# Patient Record
Sex: MALE | Race: WHITE | NOT HISPANIC OR LATINO
[De-identification: names, ages, dates, MRNs, and addresses within clinical notes are randomized per-mention and may not be internally consistent; named-entity substitution may affect disease eponyms.]

---

## 2019-09-25 ENCOUNTER — HOSPITAL (OUTPATIENT)
Dept: OTHER | Age: 59
End: 2019-09-25

## 2019-09-25 ENCOUNTER — HOSPITAL (OUTPATIENT)
Dept: OTHER | Age: 59
End: 2019-09-25
Attending: COLON & RECTAL SURGERY

## 2019-09-27 LAB — PATHOLOGIST NAME: NORMAL

## 2023-05-10 ENCOUNTER — LAB SERVICES (OUTPATIENT)
Dept: LAB | Age: 63
End: 2023-05-10

## 2023-05-10 DIAGNOSIS — Z01.818 ENCOUNTER FOR OTHER PREPROCEDURAL EXAMINATION: Primary | ICD-10-CM

## 2023-05-10 LAB
ALBUMIN SERPL-MCNC: 4 G/DL (ref 3.6–5.1)
ALBUMIN/GLOB SERPL: 1.3 {RATIO} (ref 1–2.4)
ALP SERPL-CCNC: 105 UNITS/L (ref 45–117)
ALT SERPL-CCNC: 28 UNITS/L
ANION GAP SERPL CALC-SCNC: 8 MMOL/L (ref 7–19)
AST SERPL-CCNC: 22 UNITS/L
BILIRUB SERPL-MCNC: 0.8 MG/DL (ref 0.2–1)
BUN SERPL-MCNC: 12 MG/DL (ref 6–20)
BUN/CREAT SERPL: 18 (ref 7–25)
CALCIUM SERPL-MCNC: 9.2 MG/DL (ref 8.4–10.2)
CHLORIDE SERPL-SCNC: 108 MMOL/L (ref 97–110)
CO2 SERPL-SCNC: 27 MMOL/L (ref 21–32)
CREAT SERPL-MCNC: 0.68 MG/DL (ref 0.67–1.17)
DEPRECATED RDW RBC: 45.1 FL (ref 39–50)
ERYTHROCYTE [DISTWIDTH] IN BLOOD: 13.3 % (ref 11–15)
FASTING DURATION TIME PATIENT: 0 HOURS (ref 0–999)
GFR SERPLBLD BASED ON 1.73 SQ M-ARVRAT: >90 ML/MIN
GLOBULIN SER-MCNC: 3.2 G/DL (ref 2–4)
GLUCOSE SERPL-MCNC: 102 MG/DL (ref 70–99)
HCT VFR BLD CALC: 46.9 % (ref 39–51)
HGB BLD-MCNC: 15.5 G/DL (ref 13–17)
MCH RBC QN AUTO: 30.8 PG (ref 26–34)
MCHC RBC AUTO-ENTMCNC: 33 G/DL (ref 32–36.5)
MCV RBC AUTO: 93.1 FL (ref 78–100)
NRBC BLD MANUAL-RTO: 0 /100 WBC
PLATELET # BLD AUTO: 205 K/MCL (ref 140–450)
POTASSIUM SERPL-SCNC: 4.2 MMOL/L (ref 3.4–5.1)
PROT SERPL-MCNC: 7.2 G/DL (ref 6.4–8.2)
RBC # BLD: 5.04 MIL/MCL (ref 4.5–5.9)
SODIUM SERPL-SCNC: 139 MMOL/L (ref 135–145)
WBC # BLD: 4.8 K/MCL (ref 4.2–11)

## 2023-05-10 PROCEDURE — 36415 COLL VENOUS BLD VENIPUNCTURE: CPT | Performed by: INTERNAL MEDICINE

## 2023-05-10 PROCEDURE — 80053 COMPREHEN METABOLIC PANEL: CPT | Performed by: CLINICAL MEDICAL LABORATORY

## 2023-05-10 PROCEDURE — 85027 COMPLETE CBC AUTOMATED: CPT | Performed by: CLINICAL MEDICAL LABORATORY

## 2023-05-12 RX ORDER — DEXTROSE AND SODIUM CHLORIDE 5; .45 G/100ML; G/100ML
INJECTION, SOLUTION INTRAVENOUS CONTINUOUS
Status: CANCELLED | OUTPATIENT
Start: 2023-05-12

## 2023-05-12 ASSESSMENT — ACTIVITIES OF DAILY LIVING (ADL)
ADL_BEFORE_ADMISSION: INDEPENDENT
HISTORY OF FALLING IN THE LAST YEAR (PRIOR TO ADMISSION): NO
ADL_SCORE: 12

## 2023-05-17 ENCOUNTER — ANESTHESIA (OUTPATIENT)
Dept: GASTROENTEROLOGY | Age: 63
End: 2023-05-17

## 2023-05-17 ENCOUNTER — ANESTHESIA EVENT (OUTPATIENT)
Dept: GASTROENTEROLOGY | Age: 63
End: 2023-05-17

## 2023-05-17 ENCOUNTER — HOSPITAL ENCOUNTER (OUTPATIENT)
Dept: GASTROENTEROLOGY | Age: 63
Discharge: HOME | End: 2023-05-17
Attending: COLON & RECTAL SURGERY

## 2023-05-17 VITALS
RESPIRATION RATE: 18 BRPM | TEMPERATURE: 96.8 F | SYSTOLIC BLOOD PRESSURE: 126 MMHG | OXYGEN SATURATION: 98 % | HEIGHT: 66 IN | WEIGHT: 227.74 LBS | DIASTOLIC BLOOD PRESSURE: 59 MMHG | BODY MASS INDEX: 36.6 KG/M2 | HEART RATE: 69 BPM

## 2023-05-17 DIAGNOSIS — Z15.09 LYNCH SYNDROME: ICD-10-CM

## 2023-05-17 DIAGNOSIS — Z86.010 PERSONAL HISTORY OF COLONIC POLYPS: ICD-10-CM

## 2023-05-17 PROCEDURE — 10002807 HB RX 258: Performed by: ANESTHESIOLOGY

## 2023-05-17 PROCEDURE — 13000008 HB ANESTHESIA MAC OUTSIDE OR

## 2023-05-17 PROCEDURE — 10002803 HB RX 637: Performed by: COLON & RECTAL SURGERY

## 2023-05-17 PROCEDURE — 13000028 HB GI MAJOR COMPLEX CASE S/U + 1ST 15 MIN

## 2023-05-17 PROCEDURE — 10002800 HB RX 250 W HCPCS: Performed by: ANESTHESIOLOGY

## 2023-05-17 PROCEDURE — 10002807 HB RX 258: Performed by: COLON & RECTAL SURGERY

## 2023-05-17 PROCEDURE — 13000001 HB PHASE II RECOVERY EA 30 MINUTES

## 2023-05-17 PROCEDURE — 13000029 HB GI MAJOR COMPLEX CASE EA ADD MINUTE

## 2023-05-17 PROCEDURE — 88305 TISSUE EXAM BY PATHOLOGIST: CPT | Performed by: COLON & RECTAL SURGERY

## 2023-05-17 PROCEDURE — 10002801 HB RX 250 W/O HCPCS: Performed by: ANESTHESIOLOGY

## 2023-05-17 RX ORDER — PROPOFOL 10 MG/ML
INJECTION, EMULSION INTRAVENOUS PRN
Status: DISCONTINUED | OUTPATIENT
Start: 2023-05-17 | End: 2023-05-17

## 2023-05-17 RX ORDER — LIDOCAINE HYDROCHLORIDE 20 MG/ML
INJECTION, SOLUTION INFILTRATION; PERINEURAL PRN
Status: DISCONTINUED | OUTPATIENT
Start: 2023-05-17 | End: 2023-05-17

## 2023-05-17 RX ORDER — SODIUM CHLORIDE 9 MG/ML
INJECTION, SOLUTION INTRAVENOUS CONTINUOUS
Status: DISCONTINUED | OUTPATIENT
Start: 2023-05-17 | End: 2023-05-19 | Stop reason: HOSPADM

## 2023-05-17 RX ORDER — SODIUM CHLORIDE 9 MG/ML
INJECTION, SOLUTION INTRAVENOUS CONTINUOUS PRN
Status: DISCONTINUED | OUTPATIENT
Start: 2023-05-17 | End: 2023-05-17

## 2023-05-17 RX ORDER — SIMETHICONE 20 MG/.3ML
EMULSION ORAL PRN
Status: COMPLETED | OUTPATIENT
Start: 2023-05-17 | End: 2023-05-17

## 2023-05-17 RX ADMIN — PROPOFOL 50 MG: 10 INJECTION, EMULSION INTRAVENOUS at 07:55

## 2023-05-17 RX ADMIN — LIDOCAINE HYDROCHLORIDE 50 MG: 20 INJECTION, SOLUTION INFILTRATION; PERINEURAL at 07:51

## 2023-05-17 RX ADMIN — PROPOFOL 100 MG: 10 INJECTION, EMULSION INTRAVENOUS at 07:51

## 2023-05-17 RX ADMIN — PROPOFOL 100 MCG/KG/MIN: 10 INJECTION, EMULSION INTRAVENOUS at 07:51

## 2023-05-17 RX ADMIN — FENTANYL CITRATE 50 MCG: 50 INJECTION, SOLUTION INTRAMUSCULAR; INTRAVENOUS at 07:55

## 2023-05-17 RX ADMIN — FENTANYL CITRATE 50 MCG: 50 INJECTION, SOLUTION INTRAMUSCULAR; INTRAVENOUS at 08:01

## 2023-05-17 RX ADMIN — SODIUM CHLORIDE: 9 INJECTION, SOLUTION INTRAVENOUS at 06:30

## 2023-05-17 RX ADMIN — SODIUM CHLORIDE: 900 INJECTION INTRAVENOUS at 07:39

## 2023-05-17 RX ADMIN — PROPOFOL 50 MG: 10 INJECTION, EMULSION INTRAVENOUS at 08:08

## 2023-05-17 RX ADMIN — SIMETHICONE 40 MG: 20 EMULSION ORAL at 08:15

## 2023-05-17 ASSESSMENT — PAIN SCALES - GENERAL
PAINLEVEL_OUTOF10: 0

## 2023-05-17 ASSESSMENT — ENCOUNTER SYMPTOMS: EXERCISE TOLERANCE: GOOD (>4 METS)

## 2023-05-19 LAB
ASR DISCLAIMER: NORMAL
CASE RPRT: NORMAL
CLINICAL INFO: NORMAL
PATH REPORT.FINAL DX SPEC: NORMAL
PATH REPORT.FINAL DX SPEC: NORMAL
PATH REPORT.GROSS SPEC: NORMAL

## 2023-06-30 ENCOUNTER — LAB SERVICES (OUTPATIENT)
Dept: LAB | Age: 63
End: 2023-06-30

## 2023-07-05 ENCOUNTER — LAB SERVICES (OUTPATIENT)
Dept: LAB | Age: 63
End: 2023-07-05

## 2023-07-05 DIAGNOSIS — R69 UNDIAGNOSED DISEASE OR SYNDROME PRESENT: Primary | ICD-10-CM

## 2023-07-05 PROCEDURE — 87338 HPYLORI STOOL AG IA: CPT | Performed by: INTERNAL MEDICINE

## 2023-07-06 LAB — H PYLORI AG STL QL IA: NEGATIVE

## 2023-10-30 ENCOUNTER — APPOINTMENT (OUTPATIENT)
Dept: URBAN - METROPOLITAN AREA CLINIC 315 | Age: 63
Setting detail: DERMATOLOGY
End: 2023-10-30

## 2023-10-30 DIAGNOSIS — L82.0 INFLAMED SEBORRHEIC KERATOSIS: ICD-10-CM

## 2023-10-30 DIAGNOSIS — D22 MELANOCYTIC NEVI: ICD-10-CM

## 2023-10-30 DIAGNOSIS — L81.4 OTHER MELANIN HYPERPIGMENTATION: ICD-10-CM

## 2023-10-30 DIAGNOSIS — D49.2 NEOPLASM OF UNSPECIFIED BEHAVIOR OF BONE, SOFT TISSUE, AND SKIN: ICD-10-CM

## 2023-10-30 PROBLEM — D22.5 MELANOCYTIC NEVI OF TRUNK: Status: ACTIVE | Noted: 2023-10-30

## 2023-10-30 PROCEDURE — OTHER SHAVE REMOVAL: OTHER

## 2023-10-30 PROCEDURE — OTHER MIPS QUALITY: OTHER

## 2023-10-30 PROCEDURE — A4550 SURGICAL TRAYS: HCPCS

## 2023-10-30 PROCEDURE — 11301 SHAVE SKIN LESION 0.6-1.0 CM: CPT

## 2023-10-30 PROCEDURE — OTHER COUNSELING: OTHER

## 2023-10-30 PROCEDURE — 17110 DESTRUCT B9 LESION 1-14: CPT | Mod: 59

## 2023-10-30 PROCEDURE — 99203 OFFICE O/P NEW LOW 30 MIN: CPT | Mod: 25

## 2023-10-30 PROCEDURE — OTHER LIQUID NITROGEN: OTHER

## 2023-10-30 ASSESSMENT — LOCATION SIMPLE DESCRIPTION DERM
LOCATION SIMPLE: RIGHT ANTERIOR NECK
LOCATION SIMPLE: LEFT UPPER BACK
LOCATION SIMPLE: RIGHT UPPER BACK
LOCATION SIMPLE: LEFT SHOULDER

## 2023-10-30 ASSESSMENT — LOCATION ZONE DERM
LOCATION ZONE: NECK
LOCATION ZONE: TRUNK
LOCATION ZONE: ARM

## 2023-10-30 ASSESSMENT — LOCATION DETAILED DESCRIPTION DERM
LOCATION DETAILED: RIGHT INFERIOR LATERAL NECK
LOCATION DETAILED: RIGHT SUPERIOR MEDIAL UPPER BACK
LOCATION DETAILED: RIGHT SUPERIOR LATERAL UPPER BACK
LOCATION DETAILED: LEFT POSTERIOR SHOULDER
LOCATION DETAILED: LEFT MID-UPPER BACK

## 2023-10-30 NOTE — PROCEDURE: LIQUID NITROGEN
Spray Paint Technique: No
Consent: The patient's consent was obtained including but not limited to risks of crusting, scabbing, blistering, scarring, darker or lighter pigmentary change, recurrence, incomplete removal and infection.
Medical Necessity Information: It is in your best interest to select a reason for this procedure from the list below. All of these items fulfill various CMS LCD requirements except the new and changing color options.
Spray Paint Text: The liquid nitrogen was applied to the skin utilizing a spray paint frosting technique.
Medical Necessity Clause: This procedure was medically necessary because the lesions that were treated were:
Show Spray Paint Technique Variable?: Yes
Post-Care Instructions: I reviewed with the patient in detail post-care instructions. Patient is to wear sunprotection, and avoid picking at any of the treated lesions. Pt may apply Vaseline to crusted or scabbing areas.
Detail Level: Detailed

## 2023-10-30 NOTE — PROCEDURE: MIPS QUALITY
Quality 111:Pneumonia Vaccination Status For Older Adults: Patient received any pneumococcal conjugate or polysaccharide vaccine on or after their 60th birthday and before the end of the measurement period
Quality 138: Melanoma: Coordination Of Care: Treatment plan not communicated, reason not otherwise specified.
Quality 130: Documentation Of Current Medications In The Medical Record: Current Medications Documented
Quality 110: Preventive Care And Screening: Influenza Immunization: Influenza Immunization previously received during influenza season
Detail Level: Simple
Quality 431: Preventive Care And Screening: Unhealthy Alcohol Use - Screening: Patient not identified as an unhealthy alcohol user when screened for unhealthy alcohol use using a systematic screening method
Quality 47: Advance Care Plan: Advance care planning not documented, reason not otherwise specified.
Quality 226: Preventive Care And Screening: Tobacco Use: Screening And Cessation Intervention: Patient screened for tobacco use and is an ex/non-smoker
Quality 137: Melanoma: Continuity Of Care - Recall System: Recall system not utilized, reason not otherwise specified
Quality 134: Screening For Clinical Depression And Follow-Up Plan: The patient was screened for depression and the screen was negative and no follow up required

## 2023-10-30 NOTE — HPI: SKIN LESION (IRRITATED SKIN TAGS)
How Severe Are They?: mild
Is This A New Presentation, Or A Follow-Up?: Irritated Skin Lesions
Additional History: Patient would like them removed

## 2024-05-24 ENCOUNTER — LAB SERVICES (OUTPATIENT)
Dept: LAB | Age: 64
End: 2024-05-24

## 2024-05-24 DIAGNOSIS — Z01.818 PRE-OP TESTING: ICD-10-CM

## 2024-05-24 DIAGNOSIS — Z15.09 LYNCH SYNDROME: Primary | ICD-10-CM

## 2024-05-24 LAB
ALBUMIN SERPL-MCNC: 3.7 G/DL (ref 3.6–5.1)
ALBUMIN/GLOB SERPL: 1.2 {RATIO} (ref 1–2.4)
ALP SERPL-CCNC: 94 UNITS/L (ref 45–117)
ALT SERPL-CCNC: 20 UNITS/L
ANION GAP SERPL CALC-SCNC: 9 MMOL/L (ref 7–19)
AST SERPL-CCNC: 16 UNITS/L
BASOPHILS # BLD: 0.1 K/MCL (ref 0–0.3)
BASOPHILS NFR BLD: 1 %
BILIRUB SERPL-MCNC: 0.6 MG/DL (ref 0.2–1)
BUN SERPL-MCNC: 14 MG/DL (ref 6–20)
BUN/CREAT SERPL: 16 (ref 7–25)
CALCIUM SERPL-MCNC: 8.9 MG/DL (ref 8.4–10.2)
CHLORIDE SERPL-SCNC: 109 MMOL/L (ref 97–110)
CO2 SERPL-SCNC: 28 MMOL/L (ref 21–32)
CREAT SERPL-MCNC: 0.87 MG/DL (ref 0.67–1.17)
DEPRECATED RDW RBC: 45.3 FL (ref 39–50)
EGFRCR SERPLBLD CKD-EPI 2021: >90 ML/MIN/{1.73_M2}
EOSINOPHIL # BLD: 0.2 K/MCL (ref 0–0.5)
EOSINOPHIL NFR BLD: 5 %
ERYTHROCYTE [DISTWIDTH] IN BLOOD: 13.4 % (ref 11–15)
FASTING DURATION TIME PATIENT: ABNORMAL H
GLOBULIN SER-MCNC: 3.1 G/DL (ref 2–4)
GLUCOSE SERPL-MCNC: 100 MG/DL (ref 70–99)
HCT VFR BLD CALC: 42.9 % (ref 39–51)
HGB BLD-MCNC: 14.7 G/DL (ref 13–17)
IMM GRANULOCYTES # BLD AUTO: 0 K/MCL (ref 0–0.2)
IMM GRANULOCYTES # BLD: 0 %
LYMPHOCYTES # BLD: 1.4 K/MCL (ref 1–4)
LYMPHOCYTES NFR BLD: 34 %
MCH RBC QN AUTO: 31.6 PG (ref 26–34)
MCHC RBC AUTO-ENTMCNC: 34.3 G/DL (ref 32–36.5)
MCV RBC AUTO: 92.3 FL (ref 78–100)
MONOCYTES # BLD: 0.5 K/MCL (ref 0.3–0.9)
MONOCYTES NFR BLD: 11 %
NEUTROPHILS # BLD: 2 K/MCL (ref 1.8–7.7)
NEUTROPHILS NFR BLD: 49 %
NRBC BLD MANUAL-RTO: 0 /100 WBC
PLATELET # BLD AUTO: 205 K/MCL (ref 140–450)
POTASSIUM SERPL-SCNC: 4.5 MMOL/L (ref 3.4–5.1)
PROT SERPL-MCNC: 6.8 G/DL (ref 6.4–8.2)
RBC # BLD: 4.65 MIL/MCL (ref 4.5–5.9)
SODIUM SERPL-SCNC: 141 MMOL/L (ref 135–145)
WBC # BLD: 4.2 K/MCL (ref 4.2–11)

## 2024-05-24 PROCEDURE — 85025 COMPLETE CBC W/AUTO DIFF WBC: CPT | Performed by: CLINICAL MEDICAL LABORATORY

## 2024-05-24 PROCEDURE — 36415 COLL VENOUS BLD VENIPUNCTURE: CPT | Performed by: INTERNAL MEDICINE

## 2024-05-24 PROCEDURE — 80053 COMPREHEN METABOLIC PANEL: CPT | Performed by: CLINICAL MEDICAL LABORATORY

## 2024-05-31 SDOH — SOCIAL STABILITY: SOCIAL INSECURITY: HOW OFTEN DOES ANYONE, INCLUDING FAMILY AND FRIENDS, INSULT OR TALK DOWN TO YOU?: NEVER

## 2024-05-31 SDOH — SOCIAL STABILITY: SOCIAL INSECURITY: HOW OFTEN DOES ANYONE, INCLUDING FAMILY AND FRIENDS, PHYSICALLY HURT YOU?: NEVER

## 2024-05-31 SDOH — SOCIAL STABILITY: SOCIAL INSECURITY: HOW OFTEN DOES ANYONE, INCLUDING FAMILY AND FRIENDS, SCREAM OR CURSE AT YOU?: NEVER

## 2024-05-31 SDOH — SOCIAL STABILITY: SOCIAL INSECURITY: HOW OFTEN DOES ANYONE, INCLUDING FAMILY AND FRIENDS, THREATEN YOU WITH HARM?: NEVER

## 2024-05-31 ASSESSMENT — ACTIVITIES OF DAILY LIVING (ADL)
ADL_BEFORE_ADMISSION: INDEPENDENT
ADL_SCORE: 12

## 2024-06-05 ENCOUNTER — ANESTHESIA (OUTPATIENT)
Dept: GASTROENTEROLOGY | Age: 64
End: 2024-06-05

## 2024-06-05 ENCOUNTER — HOSPITAL ENCOUNTER (OUTPATIENT)
Dept: GASTROENTEROLOGY | Age: 64
Discharge: HOME | End: 2024-06-05
Attending: COLON & RECTAL SURGERY

## 2024-06-05 ENCOUNTER — ANESTHESIA EVENT (OUTPATIENT)
Dept: GASTROENTEROLOGY | Age: 64
End: 2024-06-05

## 2024-06-05 VITALS
WEIGHT: 225 LBS | SYSTOLIC BLOOD PRESSURE: 109 MMHG | OXYGEN SATURATION: 100 % | HEIGHT: 66 IN | DIASTOLIC BLOOD PRESSURE: 67 MMHG | BODY MASS INDEX: 36.16 KG/M2 | HEART RATE: 58 BPM | TEMPERATURE: 96.8 F | RESPIRATION RATE: 18 BRPM

## 2024-06-05 DIAGNOSIS — Z86.010 HISTORY OF COLONIC POLYPS: ICD-10-CM

## 2024-06-05 DIAGNOSIS — Z15.09 LYNCH SYNDROME: ICD-10-CM

## 2024-06-05 PROCEDURE — 10002800 HB RX 250 W HCPCS: Performed by: SURGERY

## 2024-06-05 PROCEDURE — 10002801 HB RX 250 W/O HCPCS: Performed by: COLON & RECTAL SURGERY

## 2024-06-05 PROCEDURE — 13000008 HB ANESTHESIA MAC OUTSIDE OR

## 2024-06-05 PROCEDURE — 13000024 HB GI COMPLEX CASE S/U + 1ST 15 MIN

## 2024-06-05 PROCEDURE — 13000025 HB GI COMPLEX CASE EACH ADD MINUTE

## 2024-06-05 PROCEDURE — 13000001 HB PHASE II RECOVERY EA 30 MINUTES

## 2024-06-05 PROCEDURE — 10002801 HB RX 250 W/O HCPCS: Performed by: SURGERY

## 2024-06-05 PROCEDURE — 88305 TISSUE EXAM BY PATHOLOGIST: CPT | Performed by: COLON & RECTAL SURGERY

## 2024-06-05 RX ORDER — PROPOFOL 10 MG/ML
INJECTION, EMULSION INTRAVENOUS PRN
Status: DISCONTINUED | OUTPATIENT
Start: 2024-06-05 | End: 2024-06-05

## 2024-06-05 RX ORDER — LIDOCAINE HYDROCHLORIDE 20 MG/ML
INJECTION, SOLUTION INFILTRATION; PERINEURAL PRN
Status: DISCONTINUED | OUTPATIENT
Start: 2024-06-05 | End: 2024-06-05

## 2024-06-05 RX ORDER — DEXTROSE MONOHYDRATE AND SODIUM CHLORIDE 5; .45 G/100ML; G/100ML
INJECTION, SOLUTION INTRAVENOUS CONTINUOUS
Status: DISCONTINUED | OUTPATIENT
Start: 2024-06-05 | End: 2024-06-07 | Stop reason: HOSPADM

## 2024-06-05 RX ADMIN — PROPOFOL 30 MG: 10 INJECTION, EMULSION INTRAVENOUS at 07:59

## 2024-06-05 RX ADMIN — DEXTROSE AND SODIUM CHLORIDE: 5; 450 INJECTION, SOLUTION INTRAVENOUS at 06:49

## 2024-06-05 RX ADMIN — LIDOCAINE HYDROCHLORIDE 2 ML: 20 INJECTION, SOLUTION INFILTRATION; PERINEURAL at 07:58

## 2024-06-05 RX ADMIN — PROPOFOL INJECTABLE EMULSION 300 MCG/KG/MIN: 10 INJECTION, EMULSION INTRAVENOUS at 07:58

## 2024-06-05 RX ADMIN — DEXTROSE AND SODIUM CHLORIDE 300 ML: 5; 450 INJECTION, SOLUTION INTRAVENOUS at 08:27

## 2024-06-05 SDOH — SOCIAL STABILITY: SOCIAL INSECURITY: RISK FACTORS: BMI> 30 (OBESITY)

## 2024-06-05 ASSESSMENT — PAIN SCALES - GENERAL
PAINLEVEL_OUTOF10: 0

## 2024-06-05 ASSESSMENT — ENCOUNTER SYMPTOMS: EXERCISE TOLERANCE: GOOD (>4 METS)
